# Patient Record
Sex: FEMALE | Race: BLACK OR AFRICAN AMERICAN | NOT HISPANIC OR LATINO | Employment: FULL TIME | URBAN - METROPOLITAN AREA
[De-identification: names, ages, dates, MRNs, and addresses within clinical notes are randomized per-mention and may not be internally consistent; named-entity substitution may affect disease eponyms.]

---

## 2017-05-22 LAB — HIV 1 AB: NON REACTIVE

## 2019-06-03 LAB — PAP SMEAR: NORMAL

## 2022-05-17 LAB
PAP RECOMMENDATION EXT: NORMAL
PAP SMEAR: NORMAL

## 2023-03-24 ENCOUNTER — OFFICE VISIT (OUTPATIENT)
Dept: PRIMARY CARE CLINIC | Facility: CLINIC | Age: 33
End: 2023-03-24
Payer: COMMERCIAL

## 2023-03-24 VITALS
OXYGEN SATURATION: 99 % | SYSTOLIC BLOOD PRESSURE: 112 MMHG | HEIGHT: 62 IN | BODY MASS INDEX: 26.53 KG/M2 | WEIGHT: 144.19 LBS | DIASTOLIC BLOOD PRESSURE: 62 MMHG | TEMPERATURE: 98 F | HEART RATE: 62 BPM

## 2023-03-24 DIAGNOSIS — L24.9 IRRITANT CONTACT DERMATITIS, UNSPECIFIED TRIGGER: ICD-10-CM

## 2023-03-24 DIAGNOSIS — Z13.220 SCREENING FOR LIPID DISORDERS: ICD-10-CM

## 2023-03-24 DIAGNOSIS — Z11.59 ENCOUNTER FOR HEPATITIS C SCREENING TEST FOR LOW RISK PATIENT: ICD-10-CM

## 2023-03-24 DIAGNOSIS — Z00.00 WELLNESS EXAMINATION: Primary | ICD-10-CM

## 2023-03-24 DIAGNOSIS — Z13.31 NEGATIVE DEPRESSION SCREENING: ICD-10-CM

## 2023-03-24 LAB
CHOLEST SERPL-MCNC: 161 MG/DL (ref 120–199)
CHOLEST/HDLC SERPL: 3.8 {RATIO} (ref 2–5)
HDLC SERPL-MCNC: 42 MG/DL (ref 40–75)
HDLC SERPL: 26.1 % (ref 20–50)
LDLC SERPL CALC-MCNC: 108.8 MG/DL (ref 63–159)
NONHDLC SERPL-MCNC: 119 MG/DL
TRIGL SERPL-MCNC: 51 MG/DL (ref 30–150)

## 2023-03-24 PROCEDURE — 3078F PR MOST RECENT DIASTOLIC BLOOD PRESSURE < 80 MM HG: ICD-10-PCS | Mod: CPTII,S$GLB,, | Performed by: FAMILY MEDICINE

## 2023-03-24 PROCEDURE — 86803 HEPATITIS C AB TEST: CPT | Performed by: FAMILY MEDICINE

## 2023-03-24 PROCEDURE — 3008F PR BODY MASS INDEX (BMI) DOCUMENTED: ICD-10-PCS | Mod: CPTII,S$GLB,, | Performed by: FAMILY MEDICINE

## 2023-03-24 PROCEDURE — 3008F BODY MASS INDEX DOCD: CPT | Mod: CPTII,S$GLB,, | Performed by: FAMILY MEDICINE

## 2023-03-24 PROCEDURE — 99385 PREV VISIT NEW AGE 18-39: CPT | Mod: 1D,GY,S$GLB, | Performed by: FAMILY MEDICINE

## 2023-03-24 PROCEDURE — 80061 LIPID PANEL: CPT | Performed by: FAMILY MEDICINE

## 2023-03-24 PROCEDURE — 3074F PR MOST RECENT SYSTOLIC BLOOD PRESSURE < 130 MM HG: ICD-10-PCS | Mod: CPTII,S$GLB,, | Performed by: FAMILY MEDICINE

## 2023-03-24 PROCEDURE — 99385 PR PREVENTIVE VISIT,NEW,18-39: ICD-10-PCS | Mod: 1D,GY,S$GLB, | Performed by: FAMILY MEDICINE

## 2023-03-24 PROCEDURE — 3074F SYST BP LT 130 MM HG: CPT | Mod: CPTII,S$GLB,, | Performed by: FAMILY MEDICINE

## 2023-03-24 PROCEDURE — 3078F DIAST BP <80 MM HG: CPT | Mod: CPTII,S$GLB,, | Performed by: FAMILY MEDICINE

## 2023-03-24 PROCEDURE — 1159F MED LIST DOCD IN RCRD: CPT | Mod: CPTII,S$GLB,, | Performed by: FAMILY MEDICINE

## 2023-03-24 PROCEDURE — 1159F PR MEDICATION LIST DOCUMENTED IN MEDICAL RECORD: ICD-10-PCS | Mod: CPTII,S$GLB,, | Performed by: FAMILY MEDICINE

## 2023-03-24 PROCEDURE — 99202 PR OFFICE/OUTPT VISIT, NEW, LEVL II, 15-29 MIN: ICD-10-PCS | Mod: 25,S$GLB,, | Performed by: FAMILY MEDICINE

## 2023-03-24 PROCEDURE — 99202 OFFICE O/P NEW SF 15 MIN: CPT | Mod: 25,S$GLB,, | Performed by: FAMILY MEDICINE

## 2023-03-24 RX ORDER — TRIAMCINOLONE ACETONIDE 1 MG/G
OINTMENT TOPICAL 2 TIMES DAILY
Qty: 453.6 G | Refills: 3 | Status: SHIPPED | OUTPATIENT
Start: 2023-03-24

## 2023-03-24 NOTE — PROGRESS NOTES
Subjective:       Patient ID: Aurora Santo is a 32 y.o. female.    Chief Complaint: Annual Exam    New patient here to establish care and wellness exam    Past medical history: None    Health maintenance:  Pap smear [facility] [place].  Hep C screening low risk    Review of systems: + states every time she goes to the gym she gets a itchy sensation posterior aspect of both legs, which then proceeds to forearm into a patch of dry skin post workup.  States she wears leggings to the gym.  Skin in that area is not exposed to workout equipment.  States she has try a lotions and creams to the area prior to work now, but symptoms still persist.  Rash not present today as it does resolve on its own   Review of Systems   Constitutional:  Negative for appetite change, chills, fatigue, fever and unexpected weight change.   HENT:  Negative for ear discharge, ear pain, hearing loss, mouth dryness, mouth sores, postnasal drip, rhinorrhea, sinus pressure/congestion, sneezing, sore throat and trouble swallowing.    Eyes:  Negative for photophobia, pain, discharge, redness and itching.   Respiratory:  Negative for cough, chest tightness, shortness of breath and wheezing.    Cardiovascular:  Negative for chest pain, palpitations and leg swelling.   Gastrointestinal:  Negative for abdominal pain, blood in stool, constipation, diarrhea, nausea and vomiting.   Endocrine: Negative for cold intolerance, heat intolerance, polydipsia, polyphagia and polyuria.   Genitourinary:  Negative for bladder incontinence, difficulty urinating, dysuria, frequency, hematuria, nocturia and urgency.   Musculoskeletal:  Negative for arthralgias, gait problem and joint swelling.   Integumentary:  Positive for rash. Negative for wound.   Neurological:  Negative for dizziness, vertigo, syncope, weakness, numbness and headaches.   Psychiatric/Behavioral:  Negative for agitation, behavioral problems, confusion, dysphoric mood, self-injury,  sleep disturbance and suicidal ideas. The patient is not nervous/anxious and is not hyperactive.        Objective:      Physical Exam  Vitals reviewed.   Constitutional:       General: She is not in acute distress.     Appearance: Normal appearance. She is not ill-appearing.   HENT:      Right Ear: Tympanic membrane normal. There is no impacted cerumen.      Left Ear: Tympanic membrane normal. There is no impacted cerumen.      Nose: Nose normal. No congestion or rhinorrhea.      Mouth/Throat:      Pharynx: No oropharyngeal exudate or posterior oropharyngeal erythema.   Eyes:      General:         Right eye: No discharge.         Left eye: No discharge.      Pupils: Pupils are equal, round, and reactive to light.   Cardiovascular:      Rate and Rhythm: Normal rate and regular rhythm.      Heart sounds: Normal heart sounds.   Pulmonary:      Effort: Pulmonary effort is normal. No respiratory distress.      Breath sounds: Normal breath sounds.   Abdominal:      General: Bowel sounds are normal.      Palpations: Abdomen is soft.      Tenderness: There is no abdominal tenderness.   Musculoskeletal:         General: Normal range of motion.      Cervical back: Normal range of motion. No tenderness.   Neurological:      General: No focal deficit present.      Mental Status: She is alert and oriented to person, place, and time.   Psychiatric:         Mood and Affect: Mood normal.         Behavior: Behavior normal.       Assessment:       Problem List Items Addressed This Visit    None  Visit Diagnoses       Wellness examination    -  Primary    Irritant contact dermatitis, unspecified trigger        Negative depression screening        BMI 26.0-26.9,adult        Screening for lipid disorders        Relevant Orders    Lipid Panel    Encounter for hepatitis C screening test for low risk patient        Relevant Orders    Hepatitis C Antibody            Plan:       Will check screening labs today   will obtain Pap smear  results  Trial of steroid cream to area after workout.  Advised patient that she may want to consider not wearing tight leggings as it is possible that the friction of the leggings plus the moisture is contributing to the rash.  Risks, benefits, and side effects were discussed with the patient. All questions were answered to the fullest satisfaction of the patient, and pt verbalized understanding and agreement to treatment plan. Pt was to call with any new or worsening symptoms, or present to the ER.  Return to clinic if no improvement in rash         Kayleen Wright MD  Family Medicine Physician   [facility]

## 2023-03-25 LAB — HCV AB SERPL QL IA: NORMAL

## 2023-03-27 ENCOUNTER — PATIENT OUTREACH (OUTPATIENT)
Dept: ADMINISTRATIVE | Facility: HOSPITAL | Age: 33
End: 2023-03-27
Payer: COMMERCIAL

## 2023-03-27 NOTE — PROGRESS NOTES
Population Health Outreach.    [place] [facility] [place]      The following record(s)  below were uploaded for Health Maintenance .    MAMMOGRAM SCREENING            (X) PAP SMEAR  HPV SCREENING   CHLAMYDIA SCREENING    MAMMOGRAM SCREENING    DEXA SCREENING           HEMOGLOBIN A1c  LIPID PANEL  URINE MICROALBUMIN  COMPLETE METABOLIC PANEL  DM FOOT EXAM  DM EYE EXAM    COLONOSCOPY       FIT  KIT  COLOGUARD    HEP C SCREENING  (X) HIV SCREENING    ABDOMINAL AORTA ANEURYSM SCREENING (CT OF ABDOMEN)  LOW DOSE CT SCREENING (CXR)     IMMUNIZATIONS

## 2023-03-27 NOTE — LETTER
FAX      AUTHORIZATION FOR RELEASE OF   CONFIDENTIAL INFORMATION        [facility]        We are seeing Aurora Santo, date of birth 1990, in the clinic at [facility]. Kayleen Wright MD is the patient's PCP. Aurora Santo has an outstanding lab/procedure at the time we reviewed their chart. In order to help keep their health information updated, Aurora Santo has authorized us to request the following medical record(s):           ( X )  PAP SMEAR (WITHIN 3 YRS OR HPV WITH 5 YRS)                 Please fax records to [facility]  [phone]        JAIDEN LOONEY LPN  CLINICAL CARE COORDINATOR   [facility]  PHONE: [phone]  FAX: [phone]

## 2024-07-09 ENCOUNTER — PATIENT MESSAGE (OUTPATIENT)
Dept: ADMINISTRATIVE | Facility: HOSPITAL | Age: 34
End: 2024-07-09
Payer: COMMERCIAL

## 2025-08-20 ENCOUNTER — PATIENT MESSAGE (OUTPATIENT)
Dept: ADMINISTRATIVE | Facility: HOSPITAL | Age: 35
End: 2025-08-20
Payer: COMMERCIAL